# Patient Record
Sex: FEMALE | Race: WHITE | NOT HISPANIC OR LATINO | Employment: FULL TIME | URBAN - METROPOLITAN AREA
[De-identification: names, ages, dates, MRNs, and addresses within clinical notes are randomized per-mention and may not be internally consistent; named-entity substitution may affect disease eponyms.]

---

## 2018-04-20 PROBLEM — K37 APPENDICITIS: Status: ACTIVE | Noted: 2018-04-20

## 2018-08-14 PROBLEM — K37 APPENDICITIS: Status: RESOLVED | Noted: 2018-04-20 | Resolved: 2018-08-14

## 2022-11-28 ENCOUNTER — OFFICE VISIT (OUTPATIENT)
Dept: URGENT CARE | Facility: CLINIC | Age: 32
End: 2022-11-28
Payer: COMMERCIAL

## 2022-11-28 VITALS
HEIGHT: 66 IN | HEART RATE: 108 BPM | SYSTOLIC BLOOD PRESSURE: 129 MMHG | OXYGEN SATURATION: 98 % | BODY MASS INDEX: 27.16 KG/M2 | WEIGHT: 169 LBS | RESPIRATION RATE: 18 BRPM | DIASTOLIC BLOOD PRESSURE: 84 MMHG | TEMPERATURE: 98 F

## 2022-11-28 DIAGNOSIS — J06.9 VIRAL URI WITH COUGH: Primary | ICD-10-CM

## 2022-11-28 DIAGNOSIS — J02.9 SORE THROAT: ICD-10-CM

## 2022-11-28 LAB
CTP QC/QA: YES
MOLECULAR STREP A: NEGATIVE

## 2022-11-28 PROCEDURE — 3074F SYST BP LT 130 MM HG: CPT | Mod: CPTII,S$GLB,, | Performed by: PHYSICIAN ASSISTANT

## 2022-11-28 PROCEDURE — 99203 OFFICE O/P NEW LOW 30 MIN: CPT | Mod: S$GLB,,, | Performed by: PHYSICIAN ASSISTANT

## 2022-11-28 PROCEDURE — 3008F PR BODY MASS INDEX (BMI) DOCUMENTED: ICD-10-PCS | Mod: CPTII,S$GLB,, | Performed by: PHYSICIAN ASSISTANT

## 2022-11-28 PROCEDURE — 87651 STREP A DNA AMP PROBE: CPT | Mod: QW,S$GLB,, | Performed by: PHYSICIAN ASSISTANT

## 2022-11-28 PROCEDURE — 3008F BODY MASS INDEX DOCD: CPT | Mod: CPTII,S$GLB,, | Performed by: PHYSICIAN ASSISTANT

## 2022-11-28 PROCEDURE — 3079F PR MOST RECENT DIASTOLIC BLOOD PRESSURE 80-89 MM HG: ICD-10-PCS | Mod: CPTII,S$GLB,, | Performed by: PHYSICIAN ASSISTANT

## 2022-11-28 PROCEDURE — 1159F MED LIST DOCD IN RCRD: CPT | Mod: CPTII,S$GLB,, | Performed by: PHYSICIAN ASSISTANT

## 2022-11-28 PROCEDURE — 1159F PR MEDICATION LIST DOCUMENTED IN MEDICAL RECORD: ICD-10-PCS | Mod: CPTII,S$GLB,, | Performed by: PHYSICIAN ASSISTANT

## 2022-11-28 PROCEDURE — 3079F DIAST BP 80-89 MM HG: CPT | Mod: CPTII,S$GLB,, | Performed by: PHYSICIAN ASSISTANT

## 2022-11-28 PROCEDURE — 1160F RVW MEDS BY RX/DR IN RCRD: CPT | Mod: CPTII,S$GLB,, | Performed by: PHYSICIAN ASSISTANT

## 2022-11-28 PROCEDURE — 99203 PR OFFICE/OUTPT VISIT, NEW, LEVL III, 30-44 MIN: ICD-10-PCS | Mod: S$GLB,,, | Performed by: PHYSICIAN ASSISTANT

## 2022-11-28 PROCEDURE — 3074F PR MOST RECENT SYSTOLIC BLOOD PRESSURE < 130 MM HG: ICD-10-PCS | Mod: CPTII,S$GLB,, | Performed by: PHYSICIAN ASSISTANT

## 2022-11-28 PROCEDURE — 87651 POCT STREP A MOLECULAR: ICD-10-PCS | Mod: QW,S$GLB,, | Performed by: PHYSICIAN ASSISTANT

## 2022-11-28 PROCEDURE — 1160F PR REVIEW ALL MEDS BY PRESCRIBER/CLIN PHARMACIST DOCUMENTED: ICD-10-PCS | Mod: CPTII,S$GLB,, | Performed by: PHYSICIAN ASSISTANT

## 2022-11-28 RX ORDER — BENZONATATE 200 MG/1
200 CAPSULE ORAL 3 TIMES DAILY PRN
Qty: 30 CAPSULE | Refills: 0 | Status: SHIPPED | OUTPATIENT
Start: 2022-11-28 | End: 2022-12-08

## 2022-11-28 RX ORDER — PREDNISONE 10 MG/1
TABLET ORAL
Qty: 11 TABLET | Refills: 0 | Status: SHIPPED | OUTPATIENT
Start: 2022-11-28 | End: 2023-02-10

## 2022-11-28 NOTE — PROGRESS NOTES
"Subjective:       Patient ID: Jaycee Marquez is a 32 y.o. female.    Vitals:  height is 5' 6" (1.676 m) and weight is 76.7 kg (169 lb). Her oral temperature is 97.6 °F (36.4 °C). Her blood pressure is 129/84 and her pulse is 108. Her respiration is 18 and oxygen saturation is 98%.     Chief Complaint: Sore Throat    Pt presents today with sore throa and cough x's lastnight. Pt says that theraflu and robitussin with no relief. Pt has no known strep or flu exposure.      Sore Throat   This is a new problem. The current episode started yesterday. The problem has been unchanged. Neither side of throat is experiencing more pain than the other. There has been no fever. The pain is at a severity of 6/10. The pain is moderate. Associated symptoms include coughing and trouble swallowing.     Constitution: Negative for fatigue and fever.   HENT:  Positive for postnasal drip, sore throat and trouble swallowing.    Respiratory:  Positive for cough. Negative for asthma.    Allergic/Immunologic: Negative for asthma.     Objective:      Physical Exam   Constitutional: She does not appear ill. No distress.   HENT:   Head: Normocephalic and atraumatic.   Ears:   Right Ear: Tympanic membrane, external ear and ear canal normal.   Left Ear: Tympanic membrane, external ear and ear canal normal.   Mouth/Throat: Mucous membranes are moist. Posterior oropharyngeal erythema present. No oropharyngeal exudate. Oropharynx is clear.   Eyes: Conjunctivae are normal. Right eye exhibits no discharge. Left eye exhibits no discharge. Extraocular movement intact   Cardiovascular: Normal rate, regular rhythm and normal heart sounds.   No murmur heard.  Pulmonary/Chest: Effort normal and breath sounds normal. She has no wheezes. She has no rhonchi. She has no rales.   Abdominal: Normal appearance.   Musculoskeletal: Normal range of motion.         General: Normal range of motion.   Neurological: no focal deficit. She is alert.   Skin: Skin is warm, dry and " not pale. jaundice  Psychiatric: Her behavior is normal. Mood, judgment and thought content normal.   Nursing note and vitals reviewed.      Assessment:       1. Viral URI with cough    2. Sore throat          Plan:         Viral URI with cough    Sore throat  -     POCT Strep A, Molecular    Results for orders placed or performed in visit on 11/28/22   POCT Strep A, Molecular   Result Value Ref Range    Molecular Strep A, POC Negative Negative     Acceptable Yes         Other orders  -     predniSONE (DELTASONE) 10 MG tablet; Take 40mg for 1 days, take 30mg for 1 days, take 20mg for 1 days, take 10mg for 2 days  Dispense: 11 tablet; Refill: 0  -     benzonatate (TESSALON) 200 MG capsule; Take 1 capsule (200 mg total) by mouth 3 (three) times daily as needed for Cough.  Dispense: 30 capsule; Refill: 0     Does not want covid or flu testing.       Cough, Runny Nose, and the Common Cold   The Basics   Written by the doctors and editors at [facility]   What causes cough, runny nose, and other symptoms of the common cold? -- These symptoms are usually caused by a viral infection. Lots of different viruses can take hold inside your nose, mouth, throat, or airways and cause cold symptoms.  Most people get over a cold without any lasting problems. Even so, having a cold can be uncomfortable. Also, some cold symptoms can also be caused by other illnesses, such as coronavirus 2019 (COVID-19) or the flu.  What are the symptoms of the common cold? -- The symptoms include:  Sneezing  Coughing  Sniffling and runny nose  Sore throat  Chest congestion  In children, the common cold can also cause a fever. But adults do not usually get a fever when they have a cold. Some symptoms of the common cold can overlap with symptoms of COVID-19, although sneezing is uncommon in COVID-19.   When should I call the doctor or nurse? -- Contact your doctor or nurse if you live in an area where people have COVID-19. They will ask you  questions about your symptoms and whether you might be at risk. They can tell you if you should get tested for the virus that causes COVID-19. If they think you are more likely to just have a cold, they might tell you to stay home and contact them again if your symptoms change or get worse.   You should also contact your doctor or nurse if you:  Lose your sense of taste or smell  Have a fever of more than 100.4º F (38º C) that comes with shaking chills, loss of appetite, or trouble breathing  Have a fever and also have lung disease, such as emphysema or asthma  Have a cough that lasts longer than 10 days  Have chest pain when you cough or breathe deeply, have trouble breathing, or cough up blood  If you are older than 65, or if you have any chronic medical conditions such as diabetes, you should contact your doctor or nurse any time you get a long-lasting cough.  Take your child to the emergency room if they:  Become confused or stop responding to you  Have trouble breathing or have to work hard to breathe  Contact your child's doctor or nurse if the child:  Refuses to drink anything for a long time  Is younger than 4 months  Has a fever and is not acting like themself  Has a cough that lasts for more than 2 weeks and is not getting any better  Has a stuffed or runny nose that gets worse or does not get any better after 10 days  Has red eyes or yellow goop coming out of their eyes  Has ear pain, pulls at their ears, or shows other signs of having an ear infection  What can I do to feel better? -- If you are a teenager or an adult, you can try cough and cold medicines that you can get without a prescription. These medicines might help with your symptoms. But they won't cure your cold, or help you get well faster.  If you decide to try nonprescription cold medicines, be sure to follow the directions on the label. Do not combine 2 or more medicines that have acetaminophen in them. If you take too much acetaminophen,  the drug can damage your liver. Also, if you have a heart condition, high blood pressure, or you take any prescription medicines, ask your pharmacist if it is safe to take the cold medicine you have in mind.  What should I know if my child has a cold? -- In children, the common cold is often more severe than it is in adults. It also lasts longer. Plus, children often get a fever during the first 3 days of a cold.  Are cough and cold medicines safe for children? -- If your child is younger than 6, you should not give them any cold medicines. These medicines are not safe for young children. Even if your child is older than 6, cough and cold medicines are unlikely to help.  Never give aspirin to any child younger than 18 years old. In children, aspirin can cause a life-threatening condition called Reye syndrome. When giving your child acetaminophen or other nonprescription medicines, never give more than the recommended dose.  How long will I be sick? -- Colds usually last 3 to 7 days in adults and 10 days in children, but some people have symptoms for up to 2 weeks.  Can the common cold lead to more serious problems? -- In some cases, yes. In some people having a cold can lead to:  Ear infections  Worsening of asthma symptoms  Sinus infections  Pneumonia or bronchitis (infections of the lungs)  How can I keep from getting another cold? -- The most important thing you can do is to wash your hands often with soap and water. This can also prevent the spread of other illnesses like the flu and COVID-19. The table has instructions on how to wash your hands to prevent spreading illness (table 1).  The germs that cause the common cold can live on tables, door handles, and other surfaces for at least 2 hours. You never know when you might be touching germs. That's why it's so important to clean your hands often.  It's also important to stay away from other people when you are sick. This will help prevent the spread of  illness.  All topics are updated as new evidence becomes available and our peer review process is complete.  This topic retrieved from [employer] on: Sep 21, 2021.  Topic 82903 Version 20.0  Release: 29.4.2 - C29.263  © 2021 UpToDate, Inc. and/or its affiliates. All rights reserved.  table 1: Hand washing to prevent spreading illness  Wet your hands and put soap on them    Rub your hands together for at least 20 seconds. Make sure to clean your wrists, fingernails, and in between your fingers.    Rinse your hands    Dry your hands with a paper towel that you can throw away    If you are not near a sink, you can use a hand gel to clean your hands. The gels with at least 60 percent alcohol work the best. But it is better to wash with soap and water if you can.  Graphic 425859 Version 3.0  Consumer Information Use and Disclaimer   This information is not specific medical advice and does not replace information you receive from your health care provider. This is only a brief summary of general information. It does NOT include all information about conditions, illnesses, injuries, tests, procedures, treatments, therapies, discharge instructions or life-style choices that may apply to you. You must talk with your health care provider for complete information about your health and treatment options. This information should not be used to decide whether or not to accept your health care provider's advice, instructions or recommendations. Only your health care provider has the knowledge and training to provide advice that is right for you. The use of this information is governed by the [employer] End User License Agreement, available at [email or web address].The use of [employer] content is governed by the [employer] Terms of Use. ©2021 UpToDate, Inc. All rights reserved.  Copyright   © 2021 UpToDate, Inc. and/or its affiliates. All rights reserved.

## 2025-04-15 ENCOUNTER — OFFICE VISIT (OUTPATIENT)
Dept: URGENT CARE | Facility: CLINIC | Age: 35
End: 2025-04-15
Payer: COMMERCIAL

## 2025-04-15 VITALS
DIASTOLIC BLOOD PRESSURE: 80 MMHG | WEIGHT: 171.94 LBS | BODY MASS INDEX: 27.63 KG/M2 | OXYGEN SATURATION: 98 % | HEIGHT: 66 IN | SYSTOLIC BLOOD PRESSURE: 126 MMHG | HEART RATE: 80 BPM | RESPIRATION RATE: 16 BRPM | TEMPERATURE: 99 F

## 2025-04-15 DIAGNOSIS — S05.01XA ABRASION OF RIGHT CORNEA, INITIAL ENCOUNTER: Primary | ICD-10-CM

## 2025-04-15 RX ORDER — OLOPATADINE HYDROCHLORIDE 1 MG/ML
1 SOLUTION OPHTHALMIC 2 TIMES DAILY
Qty: 5 ML | Refills: 0 | Status: SHIPPED | OUTPATIENT
Start: 2025-04-15

## 2025-04-15 RX ORDER — TOBRAMYCIN 3 MG/ML
1 SOLUTION/ DROPS OPHTHALMIC EVERY 6 HOURS
Qty: 5 ML | Refills: 0 | Status: SHIPPED | OUTPATIENT
Start: 2025-04-15 | End: 2025-04-22

## 2025-04-15 NOTE — PATIENT INSTRUCTIONS
Keep contact lenses out until cleared by eye doctor.     Ibuprofen for pain.     Schedule followup with your eye doctor this week for recheck.     You must understand that you've received an Urgent Care treatment only and that you may be released before all your medical problems are known or treated. You, the patient, will arrange for follow up care as instructed.    Follow up with your PCP or specialty clinic as directed if not improved or as needed. You can call [phone] to schedule an appointment with the appropriate provider.      You, the patient, will arrange for follow up care as instructed.     If your condition worsens or fails to improve we recommend that you receive another evaluation at the ER immediately or contact your PCP to discuss your concerns.     Patient aware of treatment plan and verbalized understanding.

## 2025-04-15 NOTE — PROGRESS NOTES
"Subjective:      Patient ID: Jaycee Marquez is a 35 y.o. female.    Vitals:  height is 5' 6" (1.676 m) and weight is 78 kg (171 lb 15.3 oz). Her oral temperature is 98.5 °F (36.9 °C). Her blood pressure is 126/80 and her pulse is 80. Her respiration is 16 and oxygen saturation is 98%.     Chief Complaint: Conjunctivitis    This is a 35 y.o. female who presents today with a chief complaint of Conjunctivitis  Patient presents with:  Conjunctivitis of the right eye started last night. Pt states she developed acute redness to medial part of eye after she thought a cat hair got into it, then developed a "bubble" on eye that scared her. Mild drainage this AM. She does wear contacts but has had them out since last night.     Eye doctor is [facility]  [place]: nothing        Conjunctivitis  This is a new problem. The current episode started yesterday. The problem occurs constantly. The problem has been unchanged. Pertinent negatives include no abdominal pain, anorexia, arthralgias, change in bowel habit, chest pain, chills, congestion, coughing, diaphoresis, fatigue, fever, headaches, joint swelling, myalgias, nausea, neck pain, numbness, rash, sore throat, swollen glands, urinary symptoms, vertigo, visual change, vomiting or weakness.       Constitution: Negative for chills, sweating, fatigue and fever.   HENT:  Negative for congestion and sore throat.    Neck: Negative for neck pain.   Cardiovascular:  Negative for chest pain.   Eyes:  Positive for eye discharge, eye pain and eye redness. Negative for photophobia, blurred vision and eyelid swelling.   Respiratory:  Negative for cough.    Gastrointestinal:  Negative for abdominal pain, nausea and vomiting.   Musculoskeletal:  Negative for joint pain, joint swelling and muscle ache.   Skin:  Negative for rash.   Neurological:  Negative for history of vertigo, headaches and numbness.      Objective:     Physical Exam   HENT:   Head: Normocephalic and atraumatic.   Eyes: Lids are " everted and swept, no foreign bodies found. Left eye exhibits chemosis and discharge. No foreign body present in the left eye. Left conjunctiva is injected. Left eye exhibits normal extraocular motion and no nystagmus.   Slit lamp exam:       The left eye shows corneal abrasion and fluorescein uptake. The left eye shows no corneal ulcer and no hyphema.       left eye Valeriano exam negative  Abdominal: Normal appearance.   Neurological: She is alert.   Vitals reviewed.      Assessment:     1. Abrasion of right cornea, initial encounter        Plan:     Has corneal abrasion and mild chemosis. No steroid for chemosis due to concomitant abrasion. Will do Pataday for chemosis and antibiotics due to abrasion.   -keep contact lenses out  -see your eye doctor in 2-3 days.   Td updated since > 10 yrs.    Recheck here for worsening.     Abrasion of right cornea, initial encounter  -     Td (Tenivac) IM vaccine (>/= 6 yo)  -     tobramycin sulfate 0.3% (TOBREX) 0.3 % ophthalmic solution; Place 1 drop into the right eye every 6 (six) hours. for 7 days  Dispense: 5 mL; Refill: 0  -     olopatadine (PATANOL) 0.1 % ophthalmic solution; Place 1 drop into the right eye 2 (two) times daily. (At least 6-8 hours apart)  Dispense: 5 mL; Refill: 0        Patient Instructions   Keep contact lenses out until cleared by eye doctor.     Ibuprofen for pain.     Schedule followup with your eye doctor this week for recheck.     You must understand that you've received an Urgent Care treatment only and that you may be released before all your medical problems are known or treated. You, the patient, will arrange for follow up care as instructed.    Follow up with your PCP or specialty clinic as directed if not improved or as needed. You can call [phone] to schedule an appointment with the appropriate provider.      You, the patient, will arrange for follow up care as instructed.     If your condition worsens or fails to improve we recommend  that you receive another evaluation at the ER immediately or contact your PCP to discuss your concerns.     Patient aware of treatment plan and verbalized understanding.